# Patient Record
Sex: FEMALE | Race: ASIAN | NOT HISPANIC OR LATINO | Employment: FULL TIME | URBAN - METROPOLITAN AREA
[De-identification: names, ages, dates, MRNs, and addresses within clinical notes are randomized per-mention and may not be internally consistent; named-entity substitution may affect disease eponyms.]

---

## 2024-03-14 NOTE — PROGRESS NOTES
[facility]   Pelvic Health Physical Therapy Initial Evaluation     Date: 3/20/2024   Name: Irma Arias  Clinic Number: [id number]    Therapy Diagnosis:   Encounter Diagnoses   Name Primary?    Urinary incontinence, unspecified type     Pelvic floor dysfunction Yes     Physician: Yadiel Olson MD    Physician Orders: PT Eval and Treat   Medical Diagnosis from Referral: Urinary incontinence, unspecified type [R32]   Evaluation Date: 3/20/2024  Authorization Period Expiration: 2025  Plan of Care Expiration: 2024  Progress Note Due: 2024  Visit # / Visits authorized:  eval   FOTO: Issued Visit #: 1 /3    Precautions: Standard    Time In: 8:15 AM  Time Out: 8:50 AM  Total Appointment Time (timed & untimed codes): 35 minutes    SUBJECTIVE     Date of onset: going on for years    History of current condition - Irma reports: increased XUAN with cough/sneeze/laugh that has progressively worsened over the years. Sometimes with knees wide open at certain angles. Patient is currently david-menopausal.       OB/GYN History:  and vaginal dryness  Sexually active? Yes  Pain with vaginal exams, intercourse or tampon use? Yes with dryness    Bladder/Bowel History: trouble emptying bladder completely and urinary incontinence  Frequency of urination:   Daytime: 7-8            Nighttime: 0-1  Difficulty initiating urine stream: Yes  Urine stream: strong to mild  Complete emptying: No  Bladder leakage: Yes  Frequency of incidents: 4-5/x a day   Amount leaked (urine): few drops and small squirt   Urinary Urgency: Yes, Able to delay the urge for at least 30 minute(s).  Frequency of bowel movements:  once every other day  Difficulty initiating BM: Yes  Quality/Shape of BM: [place] Stool Chart Type 4-5  Does Patient Feel Empty after BM? Yes  Fiber Supplements or Laxative Use? Yes, Linzess   Colon leakage: No  Frequency of incidents: none   Amount leaked (bowels):  none  Form  of protection: panty liner  Number of pads required in 24 hours: 3    Pain:  No pain reported today    Imaging: see chart     Prior Therapy: yes in general  Social History: lives with their spouse  Current exercise: yes   Occupation: , mostly sitting throughout the day  Prior Level of Function: Pt was independent with all ADLs and iADLs without pain, no reports of incontinence of bowel or bladder.  Current Level of Function: Patient unable to perform cough/sneeze without urinary incontinence.     Types of fluid intake: water, coffee, and soda  Diet: regular    Habitus: well developed, well nourished  Abuse/Neglect: No     Patients goals: no longer wear a liner     Medical History: Irma  has a past medical history of Anxiety, Fibroid, and Hypertension (2019).     Surgical History: Irma Arias  has a past surgical history that includes Appendectomy; Cosmetic surgery (2022); Augmentation of breast (2022); Cholecystectomy; Shoulder surgery (Left); and Back surgery.    Medications: Irma has a current medication list which includes the following prescription(s): linzess, lisinopril, and mounjaro.    Allergies:   Review of patient's allergies indicates:   Allergen Reactions    Opioids - morphine analogues Anxiety, Itching, Nausea And Vomiting and Palpitations        OBJECTIVE     See EMR under MEDIA for written consent provided 3/20/2024  Chaperone: declined    ORTHO SCREEN  Posture in sitting: WNL  Posture in standing: WNL  Pelvic alignment: no sign of deviations noted in supine   Adductor Palpation: not assessed today    ABDOMINAL WALL ASSESSMENT  Palpation: tender  Abdominal strength: Transverse abdominus: poor to fair  Scarring: not assessed today  Diastasis: not assessed today    BREATHING MECHANICS ASSESSMENT   Thorax Assessment During Deep Respiration: WNL excursion of abdominal wall and Decreased excursion bilaterally of lateral ribs           VAGINAL PELVIC FLOOR EXAM    EXTERNAL  ASSESSMENT  Introitus: gaping  Skin condition: WNL  Scarring: none   Sensation: WNL   Pain: none  Voluntary contraction: nil  Voluntary relaxation: nil  Involuntary contraction: nil  Bearing down: nil  Perineal descent: present      INTERNAL ASSESSMENT  Pain: tender areas noted as follows: left superficial transverse perineal    Sensation: able to localized pressure appropriately   Vaginal vault: stenotic   Muscle Bulk: atrophy   Muscle Power: 2+/5  Muscle Endurance: 2 sec  # Reps To Fatigue: 3    Fast Contractions in 10 seconds: not tested today     Quality of contraction: incomplete relaxation   Specificity: WNL   Coordination: tends to hold breath during PFM contration   Prolapse check: none    Limitation/Restriction for FOTO Pelvic Floor Survey    Therapist reviewed FOTO scores for Irma Arias on 3/20/2024.   FOTO documents entered into [employer] - see Media section.    Limitation Score:          TREATMENT     Total Treatment time (time-based codes) separate from Evaluation: 10 minutes     Therapeutic Activity to improve dynamic functional performance for 10 minutes including:    Reviewed HEP:  - Diaphragmatic breathing  - TA contractions  - Helga pose    Education on pressure management. Handout given.   Bladder diary given to complete for next visit.       PATIENT EDUCATION AND HOME EXERCISES     Education provided:   general anatomy/physiology of urinary/ bowel  system, benefits of treatment, risks of treatment, and alternative methods of treatment were discussed with the patient. Additionally, anatomy/physiology of pelvic floor, posture/body mechanices, bladder retraining, diaphragmatic breathing, double voiding techniques, isometric abdominal exercises, kegels, proper bearing down techniques, fluid intake/dietary modifications, and pressure management  were reviewed.     Written Home Exercises provided: yes.  Exercises were reviewed and Irma was able to demonstrate them prior to the end of the session.  Irma demonstrated good  understanding of the education provided. See EMR under Patient Instructions for exercises provided during therapy sessions.    ASSESSMENT     Irma is a 49 y.o. female referred to outpatient Physical Therapy with a medical diagnosis of Urinary incontinence, unspecified type [R32]. Pt presents with poor knowledge of body mechanics and posture, poor trunk stability, pelvic floor tenderness, decreased pelvic muscle strength, decreased endurance of the pelvic muscles, decreased phasic ability of the pelvic muscles, poor quality of pelvic muscle contraction, poor coordination of pelvic floor muscles during ADL's leading to urinary or fecal leakage, and incomplete urination.       Patient prognosis is Good.   Patient will benefit from skilled outpatient Physical Therapy to address the deficits stated above and in the chart below, provide patient/family education, and to maximize patient's level of independence.     Plan of care discussed with patient: Yes  Patient's spiritual, cultural and educational needs considered and patient is agreeable to the plan of care and goals as stated below:     Anticipated Barriers for therapy: see PMHx    Medical Necessity is demonstrated by the following:    History  Co-morbidities and personal factors that may impact the plan of care [] LOW: no personal factors / co-morbidities  [x] MODERATE: 1-2 personal factors / co-morbidities  [] HIGH: 3+ personal factors / co-morbidities    Moderate / High Support Documentation:   Co-morbidities affecting plan of care: Anxiety    Personal Factors:   no deficits     Examination  Body Structures and Functions, activity limitations and participation restrictions that may impact the plan of care [] LOW: addressing 1-2 elements  [x] MODERATE: 3+ elements  [] HIGH: 4+ elements (please support below)    Moderate / High Support Documentation: poor knowledge of body mechanics and posture, poor trunk stability, pelvic floor  "tenderness, decreased pelvic muscle strength, decreased endurance of the pelvic muscles, decreased phasic ability of the pelvic muscles, poor quality of pelvic muscle contraction, poor coordination of pelvic floor muscles during ADL's leading to urinary or fecal leakage, and incomplete urination     Clinical Presentation [x] LOW: stable  [] MODERATE: Evolving  [] HIGH: Unstable     Decision Making/ Complexity Score: low        Goals:  Short Term Goals: 4 weeks   - Pt will demonstrate excellent knowledge and adherence to HEP to facilitate optimal recovery.  - Pt will demonstrate proper PFM contraction, relaxation, and lengthening coordinated with TA and breath for improved muscle coordination needed for functional activity.  - Pt to be educated in pelvic muscle bracing and be able to consistently perform correctly and quickly to help decrease incontinence with cough/laugh/sneeze.  - Pt will report needing </= 2-3 pad/day indicating improved PFM function needed to maintain continence.   - Pt will report no incidence of urinary incontinence 3/7 days for improved hygiene and ADL/IADL tolerance.     Long Term Goals: 10 weeks   - Pt will demonstrate excellent knowledge and adherence to HEP for continued self-maintenance of symptoms.  - Pt to correctly and consistently perform "the knack" prior to coughing, laughing or sneezing to decrease risk of leakage.  - Pt will report no incidence of urinary incontinence 6/7 days for improved hygiene and ADL/IADL tolerance.   - Pt will report needing </= 0-1 pad/day indicating improved PFM function needed to maintain continence.   - Pt will demonstrate PFM strength of at least 3/5 MMT for improved strength needed to maintain continence.     PLAN     Plan of care Certification: 3/20/2024 to 05/29/2024.    Outpatient Physical Therapy 1 time(s) every week(s) for 10 weeks to include the following interventions: Electrical Stimulation TENS/IFC, Manual Therapy, Moist Heat/ Ice, " Neuromuscular Re-ed, Patient Education, Self Care, Therapeutic Activities, Therapeutic Exercise, and FDN, and ASTYM .     Nikita Stubbs, PT      I CERTIFY THE NEED FOR THESE SERVICES FURNISHED UNDER THIS PLAN OF TREATMENT AND WHILE UNDER MY CARE   Physician's comments:     Physician's Signature: ___________________________________________________

## 2024-03-20 ENCOUNTER — CLINICAL SUPPORT (OUTPATIENT)
Dept: REHABILITATION | Facility: HOSPITAL | Age: 49
End: 2024-03-20
Attending: STUDENT IN AN ORGANIZED HEALTH CARE EDUCATION/TRAINING PROGRAM
Payer: COMMERCIAL

## 2024-03-20 DIAGNOSIS — R32 URINARY INCONTINENCE, UNSPECIFIED TYPE: ICD-10-CM

## 2024-03-20 DIAGNOSIS — M62.89 PELVIC FLOOR DYSFUNCTION: Primary | ICD-10-CM

## 2024-03-20 PROCEDURE — 97161 PT EVAL LOW COMPLEX 20 MIN: CPT | Mod: PN

## 2024-03-20 PROCEDURE — 97530 THERAPEUTIC ACTIVITIES: CPT | Mod: PN

## 2024-03-20 NOTE — PLAN OF CARE
[facility]   Pelvic Health Physical Therapy Initial Evaluation     Date: 3/20/2024   Name: Irma Arias  Clinic Number: [id number]    Therapy Diagnosis:   Encounter Diagnoses   Name Primary?    Urinary incontinence, unspecified type     Pelvic floor dysfunction Yes     Physician: Yadiel Olson MD    Physician Orders: PT Eval and Treat   Medical Diagnosis from Referral: Urinary incontinence, unspecified type [R32]   Evaluation Date: 3/20/2024  Authorization Period Expiration: 2025  Plan of Care Expiration: 2024  Progress Note Due: 2024  Visit # / Visits authorized:  eval   FOTO: Issued Visit #: 1 /3    Precautions: Standard    Time In: 8:15 AM  Time Out: 8:50 AM  Total Appointment Time (timed & untimed codes): 35 minutes    SUBJECTIVE     Date of onset: going on for years    History of current condition - Irma reports: increased XUAN with cough/sneeze/laugh that has progressively worsened over the years. Sometimes with knees wide open at certain angles. Patient is currently david-menopausal.       OB/GYN History:  and vaginal dryness  Sexually active? Yes  Pain with vaginal exams, intercourse or tampon use? Yes with dryness    Bladder/Bowel History: trouble emptying bladder completely and urinary incontinence  Frequency of urination:   Daytime: 7-8            Nighttime: 0-1  Difficulty initiating urine stream: Yes  Urine stream: strong to mild  Complete emptying: No  Bladder leakage: Yes  Frequency of incidents: 4-5/x a day   Amount leaked (urine): few drops and small squirt   Urinary Urgency: Yes, Able to delay the urge for at least 30 minute(s).  Frequency of bowel movements: once every other day  Difficulty initiating BM: Yes  Quality/Shape of BM: [place] Stool Chart Type 4-5  Does Patient Feel Empty after BM? Yes  Fiber Supplements or Laxative Use? Yes, Linzess   Colon leakage: No  Frequency of incidents: none   Amount leaked (bowels): none  Form  of protection: panty liner  Number of pads required in 24 hours: 3    Pain:  No pain reported today    Imaging: see chart     Prior Therapy: yes in general  Social History: lives with their spouse  Current exercise: yes   Occupation: , mostly sitting throughout the day  Prior Level of Function: Pt was independent with all ADLs and iADLs without pain, no reports of incontinence of bowel or bladder.  Current Level of Function: Patient unable to perform cough/sneeze without urinary incontinence.     Types of fluid intake: water, coffee, and soda  Diet: regular    Habitus: well developed, well nourished  Abuse/Neglect: No     Patients goals: no longer wear a liner     Medical History: Irma  has a past medical history of Anxiety, Fibroid, and Hypertension (2019).     Surgical History: Irma Arias  has a past surgical history that includes Appendectomy; Cosmetic surgery (2022); Augmentation of breast (2022); Cholecystectomy; Shoulder surgery (Left); and Back surgery.    Medications: Irma has a current medication list which includes the following prescription(s): linzess, lisinopril, and mounjaro.    Allergies:   Review of patient's allergies indicates:   Allergen Reactions    Opioids - morphine analogues Anxiety, Itching, Nausea And Vomiting and Palpitations        OBJECTIVE     See EMR under MEDIA for written consent provided 3/20/2024  Chaperone: declined    ORTHO SCREEN  Posture in sitting: WNL  Posture in standing: WNL  Pelvic alignment: no sign of deviations noted in supine   Adductor Palpation: not assessed today    ABDOMINAL WALL ASSESSMENT  Palpation: tender  Abdominal strength: Transverse abdominus: poor to fair  Scarring: not assessed today  Diastasis: not assessed today    BREATHING MECHANICS ASSESSMENT   Thorax Assessment During Deep Respiration: WNL excursion of abdominal wall and Decreased excursion bilaterally of lateral ribs           VAGINAL PELVIC FLOOR EXAM    EXTERNAL  ASSESSMENT  Introitus: gaping  Skin condition: WNL  Scarring: none   Sensation: WNL   Pain: none  Voluntary contraction: nil  Voluntary relaxation: nil  Involuntary contraction: nil  Bearing down: nil  Perineal descent: present      INTERNAL ASSESSMENT  Pain: tender areas noted as follows: left superficial transverse perineal    Sensation: able to localized pressure appropriately   Vaginal vault: stenotic   Muscle Bulk: atrophy   Muscle Power: 2+/5  Muscle Endurance: 2 sec  # Reps To Fatigue: 3    Fast Contractions in 10 seconds: not tested today     Quality of contraction: incomplete relaxation   Specificity: WNL   Coordination: tends to hold breath during PFM contration   Prolapse check: none    Limitation/Restriction for FOTO Pelvic Floor Survey    Therapist reviewed FOTO scores for Irma Arias on 3/20/2024.   FOTO documents entered into [employer] - see Media section.    Limitation Score:          TREATMENT     Total Treatment time (time-based codes) separate from Evaluation: 10 minutes     Therapeutic Activity to improve dynamic functional performance for 10 minutes including:    Reviewed HEP:  - Diaphragmatic breathing  - TA contractions  - Helga pose    Education on pressure management. Handout given.   Bladder diary given to complete for next visit.       PATIENT EDUCATION AND HOME EXERCISES     Education provided:   general anatomy/physiology of urinary/ bowel  system, benefits of treatment, risks of treatment, and alternative methods of treatment were discussed with the patient. Additionally, anatomy/physiology of pelvic floor, posture/body mechanices, bladder retraining, diaphragmatic breathing, double voiding techniques, isometric abdominal exercises, kegels, proper bearing down techniques, fluid intake/dietary modifications, and pressure management were reviewed.     Written Home Exercises provided: yes.  Exercises were reviewed and Irma was able to demonstrate them prior to the end of the session.  Irma demonstrated good  understanding of the education provided. See EMR under Patient Instructions for exercises provided during therapy sessions.    ASSESSMENT     Irma is a 49 y.o. female referred to outpatient Physical Therapy with a medical diagnosis of Urinary incontinence, unspecified type [R32]. Pt presents with poor knowledge of body mechanics and posture, poor trunk stability, pelvic floor tenderness, decreased pelvic muscle strength, decreased endurance of the pelvic muscles, decreased phasic ability of the pelvic muscles, poor quality of pelvic muscle contraction, poor coordination of pelvic floor muscles during ADL's leading to urinary or fecal leakage, and incomplete urination.       Patient prognosis is Good.   Patient will benefit from skilled outpatient Physical Therapy to address the deficits stated above and in the chart below, provide patient/family education, and to maximize patient's level of independence.     Plan of care discussed with patient: Yes  Patient's spiritual, cultural and educational needs considered and patient is agreeable to the plan of care and goals as stated below:     Anticipated Barriers for therapy: see PMHx    Medical Necessity is demonstrated by the following:    History  Co-morbidities and personal factors that may impact the plan of care [] LOW: no personal factors / co-morbidities  [x] MODERATE: 1-2 personal factors / co-morbidities  [] HIGH: 3+ personal factors / co-morbidities    Moderate / High Support Documentation:   Co-morbidities affecting plan of care: Anxiety    Personal Factors:   no deficits     Examination  Body Structures and Functions, activity limitations and participation restrictions that may impact the plan of care [] LOW: addressing 1-2 elements  [x] MODERATE: 3+ elements  [] HIGH: 4+ elements (please support below)    Moderate / High Support Documentation: poor knowledge of body mechanics and posture, poor trunk stability, pelvic floor  "tenderness, decreased pelvic muscle strength, decreased endurance of the pelvic muscles, decreased phasic ability of the pelvic muscles, poor quality of pelvic muscle contraction, poor coordination of pelvic floor muscles during ADL's leading to urinary or fecal leakage, and incomplete urination     Clinical Presentation [x] LOW: stable  [] MODERATE: Evolving  [] HIGH: Unstable     Decision Making/ Complexity Score: low        Goals:  Short Term Goals: 4 weeks   - Pt will demonstrate excellent knowledge and adherence to HEP to facilitate optimal recovery.  - Pt will demonstrate proper PFM contraction, relaxation, and lengthening coordinated with TA and breath for improved muscle coordination needed for functional activity.  - Pt to be educated in pelvic muscle bracing and be able to consistently perform correctly and quickly to help decrease incontinence with cough/laugh/sneeze.  - Pt will report needing </= 2-3 pad/day indicating improved PFM function needed to maintain continence.   - Pt will report no incidence of urinary incontinence 3/7 days for improved hygiene and ADL/IADL tolerance.     Long Term Goals: 10 weeks   - Pt will demonstrate excellent knowledge and adherence to HEP for continued self-maintenance of symptoms.  - Pt to correctly and consistently perform "the knack" prior to coughing, laughing or sneezing to decrease risk of leakage.  - Pt will report no incidence of urinary incontinence 6/7 days for improved hygiene and ADL/IADL tolerance.   - Pt will report needing </= 0-1 pad/day indicating improved PFM function needed to maintain continence.   - Pt will demonstrate PFM strength of at least 3/5 MMT for improved strength needed to maintain continence.     PLAN     Plan of care Certification: 3/20/2024 to 05/29/2024.    Outpatient Physical Therapy 1 time(s) every week(s) for 10 weeks to include the following interventions: Electrical Stimulation TENS/IFC, Manual Therapy, Moist Heat/ Ice, " Neuromuscular Re-ed, Patient Education, Self Care, Therapeutic Activities, Therapeutic Exercise, and FDN, and ASTYM.     Nikita Stubbs, PT      I CERTIFY THE NEED FOR THESE SERVICES FURNISHED UNDER THIS PLAN OF TREATMENT AND WHILE UNDER MY CARE   Physician's comments:     Physician's Signature: ___________________________________________________

## 2024-03-20 NOTE — PATIENT INSTRUCTIONS
PELVIC FLOOR PRESSURE MANAGEMENT         Your pelvic floor muscles and your diaphragm work closely together to regulate the pressure in your body. Each time you inhale, your diaphragm contracts, flattens, and moves downward. In response, your pelvic floor muscles relax and drop down as well. When you exhale, both muscles lift upwards. Throughout a diaphragmatic breath cycle, your pelvic floor goes through a full range of motion that contributes to its optimal function.    Think of your body like a canister, with one opening at the top where your mouth is and another opening at the bottom through your pelvic floor. If the top of the canister is closed off (as with straining during bowel movements or Valsalva maneuver during lifting) all of the pressure moves downward. If your pelvic floor muscles are not strong enough to counteract this increased pressure, you may experience leaking or increased sensations of pelvic organ prolapse.     This is why it is extremely important to implement the following pressure management techniques:  Avoiding Constipation - make high-fiber foods part of your regular diet (fruits, vegetables, bran, and beans), reduce the amount of processed foods in your diet, drink plenty of water and fluids every day, exercise daily, and manage your stress with meditation or other relaxation techniques.  No Straining! Straining (bearing down and holding your breath) puts additional downward pressure on our organs, causing increased prolapse and pelvic pressure. Instead, blow out the candles as you gently push down. Do NOT hold your breath!  Use a Stool - Utilize a squat position when voiding. Get your knees up higher than your hips. Squatting helps relax the pelvic floor muscles and reduces straining.  Avoid heavy lifting and high-impact activities until you can strengthen your core and pelvic floor muscles appropriately. When you do have to lift, hold the load close to your body to reduce strain  and do not hold your breath when lifting.  Do not Valsalva (hold your breath and push) at any time.  Use diaphragmatic breathing (belly breathing) to manage stress, help empty bladder, help empty bowels, and help lightly stretch pelvic floor muscles.